# Patient Record
Sex: MALE | Race: BLACK OR AFRICAN AMERICAN
[De-identification: names, ages, dates, MRNs, and addresses within clinical notes are randomized per-mention and may not be internally consistent; named-entity substitution may affect disease eponyms.]

---

## 2020-11-01 ENCOUNTER — HOSPITAL ENCOUNTER (EMERGENCY)
Dept: HOSPITAL 62 - ER | Age: 30
Discharge: HOME | End: 2020-11-01
Payer: SELF-PAY

## 2020-11-01 VITALS — SYSTOLIC BLOOD PRESSURE: 128 MMHG | DIASTOLIC BLOOD PRESSURE: 75 MMHG
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
## 2020-11-01 DIAGNOSIS — S60.222A: Primary | ICD-10-CM

## 2020-11-01 DIAGNOSIS — S49.92XA: ICD-10-CM

## 2020-11-01 PROCEDURE — 99283 EMERGENCY DEPT VISIT LOW MDM: CPT

## 2020-11-01 NOTE — RADIOLOGY REPORT (SQ)
EXAM DESCRIPTION:  SHOULDER LEFT 2 OR MORE VIEWS; HAND LEFT 3 VIEWS



IMAGES COMPLETED DATE/TIME:  11/1/2020 10:30 am



REASON FOR STUDY:  left shoulder pain; left hand injury



COMPARISON:  None.



FINDINGS:  Three views left shoulder:  AP internal and external rotation and scapular Y images.  No a
cute or suspicious bone, joint or soft tissue abnormality.

Three views left hand:  Mild soft tissue swelling of the long finger at the PIP joint particularly ra
dial aspect.  The joint here looks normal.  No subluxation or dislocation or fracture of the hand or 
carpus.



TECHNICAL DOCUMENTATION:  JOB ID:  [id number]



Reading location - IP/workstation name: [email or web address]

## 2020-11-01 NOTE — ER DOCUMENT REPORT
HPI





- HPI


Patient complains to provider of: shoulder pain


Time Seen by Provider: 11/01/20 10:11


Notes: 


30-year-old male to the emergency department with complaints of left shoulder 

pain.  He states that it has been bothering him a little bit for several months 

but it got significantly worse after he was playing football.  He states he 

tackled somebody and struck his left shoulder wrong.  Since then he has been 

having quite a bit of pain and cannot lift the arm completely.  He also states 

that he thinks he fell down onto his left hand.  He has swelling and pain over 

lying the thumb area.  He is left-hand dominant.  He denies any other injuries. 

He did have 1 pads and helmet.  He did not hit his head or have loss of 

consciousness.





- ROS


Systems Reviewed and Negative: Yes All other systems reviewed and negative





- CONSTITUTIONAL


Constitutional: DENIES: Fever, Chills - left shoulder pain and left hand pain





- EENT


EENT: DENIES: Sore Throat, Ear Pain, Congestion





- NEURO


Neurology: DENIES: Headache, Weakness





- CARDIOVASCULAR


Cardiovascular: DENIES: Chest pain





- RESPIRATORY


Respiratory: DENIES: Trouble Breathing, Coughing





- GASTROINTESTINAL


Gastrointestinal: DENIES: Abdominal Pain, Nausea, Patient vomiting, Diarrhea





- MUSCULOSKELETAL


Musculoskeletal: REPORTS: Extremity pain





- DERM


Skin Color: Normal


Notes: 





swelling to the back of the left hand





Past Medical History





- General


Information source: Patient





- Social History


Smoking Status: Never Smoker


Frequency of alcohol use: Social


Drug Abuse: None


Family History: Reviewed & Not Pertinent





Vertical Provider Document





- CONSTITUTIONAL


Agree With Documented VS: Yes


Exam Limitations: No Limitations


General Appearance: WD/WN, No Apparent Distress





- HEENT


HEENT: Atraumatic, Normocephalic, PERRLA





- NECK


Neck: Normal Inspection, Supple


Notes: 





Nontender to palpation in the midline cervical spine with no step-off or 

deformity.





- RESPIRATORY


Respiratory: Breath Sounds Normal, No Respiratory Distress.  negative: Rales, 

Rhonchi, Wheezing





- CARDIOVASCULAR


Cardiovascular: Regular Rate, Regular Rhythm, No Murmur





- GI/ABDOMEN


Gastrointestinal: Abdomen Soft, Abdomen Non-Tender, No Organomegaly





- BACK


Back: Normal Inspection


Notes: 





Nontender to palpation to the midline thoracic and lumbar spine with no step-off

or deformity.  Patient can ambulate with ease.  Negative straight leg raise.





- MUSCULOSKELETAL/EXTREMETIES


Notes: 





There is tenderness to palpation over the left AC joint.  It seems to be more 

edematous than the other side.  Patient has decreased range of motion in the 

shoulder in all planes of motion.  He has about 35 degrees of motion.  There is 

no tenderness to palpation to the left elbow or left wrist.  There is tenderness

to palpation to the back of the left hand with noted edema and erythema.  There 

is also positive left snuffbox tenderness.  Patient has a handgrip that is about

4 out of 5 on the left side in comparison to a 5 out of 5  on the right.  

Cap refills less than 2 seconds.  Radial pulses are intact and equal.





- NEURO


Level of Consciousness: Awake, Alert


Motor/Sensory: No Motor Deficit, No Sensory Deficit





- DERM


Integumentary: Warm, Dry





Course





- Re-evaluation


Re-evalutation: 





Impression: Left shoulder injury.  No AC separation, dislocation, fracture.  

Suspect possibly rotator cuff or labral injury.  Will place patient in a 

shoulder immobilizer.  In regards to his hand there is no x-ray findings for 

acute fracture.  However, he does have tenderness to palpation in the left 

snuffbox region.  We will go ahead and place in a thumb spica splint.  We will 

have him follow-up with an orthopedist.  Will send home with pain medicine and 

muscle relaxants.  Patient agrees with the plan.





- Vital Signs


Vital signs: 


                                        











Temp Pulse Resp BP Pulse Ox


 


 98.7 F   77   16   128/75 H  99 


 


 11/01/20 09:38  11/01/20 09:38  11/01/20 09:38  11/01/20 09:38  11/01/20 09:38














- Diagnostic Test


Radiology reviewed: Image reviewed, Reports reviewed





Procedures





- Immobilization


  ** Left Hand


Pre-Proc Neuro Vasc Exam: Normal


Immobilizer type: Thumb spica


Performed by: PCT


Post-Proc Neuro Vasc Exam: Normal


Alignment checked and good: Yes





  ** Left Shoulder


Pre-Proc Neuro Vasc Exam: Normal


Immobilizer type: Shoulder immobilizer


Performed by: PCT


Post-Proc Neuro Vasc Exam: Normal


Alignment checked and good: Yes





Discharge





- Discharge


Clinical Impression: 


Injury of left shoulder


Qualifiers:


 Encounter type: initial encounter Qualified Code(s): S49.92XA - Unspecified 

injury of left shoulder and upper arm, initial encounter





Left shoulder pain


Qualifiers:


 Chronicity: acute Qualified Code(s): M25.512 - Pain in left shoulder





Contusion of left hand


Qualifiers:


 Encounter type: initial encounter Qualified Code(s): S60.222A - Contusion of 

left hand, initial encounter





Condition: Stable


Disposition: HOME, SELF-CARE


Instructions:  Contusion ([facility]), Ice & Elevation ([facility]), Shoulder Injury ([facility])


Additional Instructions: 


Today your x-rays did not reveal any fractures, separation, dislocation of the 

left shoulder.  Please still use the shoulder immobilizer to aid in comfort.  

Still would like you to see an orthopedist to evaluate for any internal liga

mentous injury to your shoulder blade.  Your hand x-ray was negative for acute 

fracture.  However since he fell onto an outstretched hand we will splinted here

today and I would like you to see an orthopedist for that as well.  Return if 

any worsening symptoms.  May ice both areas 3 times a day for 20 minutes at a 

time.  Return if worse.


Prescriptions: 


Ketorolac Tromethamine [Toradol 10 mg Tablet] 10 mg PO Q8HP PRN #15 tablet


 PRN Reason: 


Cyclobenzaprine HCl [Flexeril 10 mg Tablet] 10 mg PO TID #21 tablet


Forms:  Special Work Note


Referrals: 


GREGG CHEATHAM DO [ACTIVE STAFF] - Follow up in 3-5 days (for orthopedic follow 

up)

## 2020-11-01 NOTE — RADIOLOGY REPORT (SQ)
EXAM DESCRIPTION:  SHOULDER LEFT 2 OR MORE VIEWS; HAND LEFT 3 VIEWS



IMAGES COMPLETED DATE/TIME:  11/1/2020 10:30 am



REASON FOR STUDY:  left shoulder pain; left hand injury



COMPARISON:  None.



FINDINGS:  Three views left shoulder:  AP internal and external rotation and scapular Y images.  No a
cute or suspicious bone, joint or soft tissue abnormality.

Three views left hand:  Mild soft tissue swelling of the long finger at the PIP joint particularly ra
dial aspect.  The joint here looks normal.  No subluxation or dislocation or fracture of the hand or 
carpus.



TECHNICAL DOCUMENTATION:  JOB ID:  [id number]



Reading location - IP/workstation name: [email or web address]